# Patient Record
Sex: FEMALE | Race: WHITE | NOT HISPANIC OR LATINO | Employment: FULL TIME | URBAN - NONMETROPOLITAN AREA
[De-identification: names, ages, dates, MRNs, and addresses within clinical notes are randomized per-mention and may not be internally consistent; named-entity substitution may affect disease eponyms.]

---

## 2024-08-22 ENCOUNTER — APPOINTMENT (OUTPATIENT)
Age: 29
End: 2024-08-22
Payer: COMMERCIAL

## 2024-08-22 VITALS
OXYGEN SATURATION: 99 % | HEART RATE: 102 BPM | DIASTOLIC BLOOD PRESSURE: 90 MMHG | BODY MASS INDEX: 33.9 KG/M2 | HEIGHT: 64 IN | WEIGHT: 198.6 LBS | SYSTOLIC BLOOD PRESSURE: 130 MMHG

## 2024-08-22 DIAGNOSIS — Z11.4 SCREENING FOR HIV (HUMAN IMMUNODEFICIENCY VIRUS): ICD-10-CM

## 2024-08-22 DIAGNOSIS — H61.22 IMPACTED CERUMEN OF LEFT EAR: ICD-10-CM

## 2024-08-22 DIAGNOSIS — Z23 NEED FOR DIPHTHERIA-TETANUS-PERTUSSIS (TDAP) VACCINE: ICD-10-CM

## 2024-08-22 DIAGNOSIS — Z11.59 NEED FOR HEPATITIS C SCREENING TEST: ICD-10-CM

## 2024-08-22 DIAGNOSIS — Z00.00 ANNUAL PHYSICAL EXAM: Primary | ICD-10-CM

## 2024-08-22 DIAGNOSIS — Z13.220 SCREENING, LIPID: ICD-10-CM

## 2024-08-22 DIAGNOSIS — Z13.1 SCREENING FOR DIABETES MELLITUS: ICD-10-CM

## 2024-08-22 PROCEDURE — 99385 PREV VISIT NEW AGE 18-39: CPT | Performed by: STUDENT IN AN ORGANIZED HEALTH CARE EDUCATION/TRAINING PROGRAM

## 2024-08-22 PROCEDURE — 90471 IMMUNIZATION ADMIN: CPT | Performed by: STUDENT IN AN ORGANIZED HEALTH CARE EDUCATION/TRAINING PROGRAM

## 2024-08-22 PROCEDURE — 90715 TDAP VACCINE 7 YRS/> IM: CPT | Performed by: STUDENT IN AN ORGANIZED HEALTH CARE EDUCATION/TRAINING PROGRAM

## 2024-08-22 PROCEDURE — 1036F TOBACCO NON-USER: CPT | Performed by: STUDENT IN AN ORGANIZED HEALTH CARE EDUCATION/TRAINING PROGRAM

## 2024-08-22 PROCEDURE — 3008F BODY MASS INDEX DOCD: CPT | Performed by: STUDENT IN AN ORGANIZED HEALTH CARE EDUCATION/TRAINING PROGRAM

## 2024-08-22 PROCEDURE — 69209 REMOVE IMPACTED EAR WAX UNI: CPT | Performed by: STUDENT IN AN ORGANIZED HEALTH CARE EDUCATION/TRAINING PROGRAM

## 2024-08-22 ASSESSMENT — PATIENT HEALTH QUESTIONNAIRE - PHQ9
1. LITTLE INTEREST OR PLEASURE IN DOING THINGS: NOT AT ALL
2. FEELING DOWN, DEPRESSED OR HOPELESS: NOT AT ALL
SUM OF ALL RESPONSES TO PHQ9 QUESTIONS 1 AND 2: 0

## 2024-08-22 NOTE — PATIENT INSTRUCTIONS
"Sinuses:    Every day twice daily    3) 2 puffs Fluticasone in each nostril    After two weeks, reduce that Flonase to 2 puffs once daily     Use ear drops to see if you can clear to wax.    Routine adult health maintenance   It sounds as if things are going well. Keep up the good work!     Exercise:   - Try for at least 150 minutes of cardiovascular (strenuous) exercise per week.   Safety:   - Wear your seat belt at all times when in a car and NO TEXTING/CELL PHONES while driving.   - Wear a helmet while biking, using a motorcycle, skiing/snow boarding, skate/long boarding, or any activities faster than running.   - Avoid smoking.   - If you have a gun it needs to be locked up and stored unloaded away from children.   Nutrition:   - Drink plenty of water each day   - No more than 2 alcoholic drinks per day for men. No more than 1 alcoholic drink per day for woman.   - Read labels for calories   - Use website \"My Fitness Pal\" for free calorie counting tool when possible.   Stress:   - Make time for activities that allow you to decrease stress and recognize any red flags of stress for you to know when to activate your stress release mechanisms.   Sleep:   - Try to get 7-9 hours of sleep each night.   Preventative Care:   - Follow-up yearly for your annual exams   - For most people, the following are indicated:  - reproductive age females prenatal vitamin daily  - Colonoscopies for colon cancer screening starting at age 45, then every 3-10 years  - Mammograms for breast cancer screening every 1-2 years starting at age 40  - Annual flu vaccination   - Bone density screening at age 65   - Pneumonia vaccination at age 65 (some people need this before age 65, so ask your doctor!)  - Shingles vaccination (shingrix) at age 50   "

## 2024-08-22 NOTE — PROGRESS NOTES
Subjective:  Rocio Munoz is a 29 y.o. female who presents to clinic today for annual exam    Employment:  - What do you do for work or school? [employer]  - How is school/work going? Going okay    Social History:  - Who lives with you at home? Mom and her boyfriend  - Have you received a COVID vaccine?: yes    Medical History:   (Please complete in history tab)  - Any changes to your personal or family history? Updated today  - Any personal/family history of the following:   Hypertension, heart attack, high cholesterol, stroke  Cancer of the colon, breast, prostate or skin  Addiction, alcoholism, mental health concerns   Diabetes, thyroid or autoimmune disease    PHQ2- 0    (Provider to ask):    Pillars of health:  - Any issues with sleep? no  - How do you feel about your eating habits? Likes to eat sweets  - What do you do to be physically active? nothing And how often? N/a  - Do you have any goals related to exercise or nutrition? Would like to lose weight and increase activity  - How is your stress level? okay Manageable or is it a problem? manageable  - Any guns in the home? no     Gynecologic History:  (Please complete obstetric history in the history tab)  - Do you have any menstrual concerns? No  - Do you have any breast concerns? no    - Date of last pap smear: no  - Results of last pap smear, if known: N/A  - Personal history of prior abnormal pap smear?: no       Sexual history (provider to ask):  - Have you been sexually active within the past year? no    - Do you have a personal history of sexually transmitted infections (STI)?: no  - Have you ever been tested for HIV? no  - Do you want comprehensive STI testing today? no    Domestic Violence Screening (Provider to ask):  Because violence is so common in many people's lives and because there is help available for those being abused, I now ask every patient about domestic violence:  - Within the past year have you been hit, slapped, kicked or  otherwise physically hurt by someone? ]no  - Are you in a relationship with a person who threatens or physically hurts you? no  - Has anyone forced you to have sexual activities that made you feel uncomfortable? no      Last week she had significant sore throat, challenges with swallowing and pain on one side of her throat. She had not had this before. It completely resolved.    She hasn't had bloodwork done in many years.  Review of Systems    Assessment/Plan:  Rocio Munoz is a 29 y.o. female who presents to clinic today to address the following issues:   1. Screening, lipid  Lipid panel      2. Screening for HIV (human immunodeficiency virus)  HIV 1/2 Antigen/Antibody Screen with Reflex to Confirmation      3. Need for hepatitis C screening test  Hepatitis C antibody      4. Screening for diabetes mellitus  Comprehensive metabolic panel      5. Annual physical exam        6. Need for diphtheria-tetanus-pertussis (Tdap) vaccine  Tdap vaccine, age 7 years and older  (BOOSTRIX)        Well woman exam within normal limits.  Did discuss with Alicia the need for increased exercise and improve diet.  We will obtain screening labs as above as well as give Tdap vaccine.    Discussed some signs of eustachian tube dysfunction/chronic nasal congestion and she will trial flonase.  Patient Instructions   Sinuses:    Every day twice daily    3) 2 puffs Fluticasone in each nostril    After two weeks, reduce that Flonase to 2 puffs once daily     Use ear drops to see if you can clear to wax.    Routine adult health maintenance   It sounds as if things are going well. Keep up the good work!     Exercise:   - Try for at least 150 minutes of cardiovascular (strenuous) exercise per week.   Safety:   - Wear your seat belt at all times when in a car and NO TEXTING/CELL PHONES while driving.   - Wear a helmet while biking, using a motorcycle, skiing/snow boarding, skate/long boarding, or any activities faster than running.  "  - Avoid smoking.   - If you have a gun it needs to be locked up and stored unloaded away from children.   Nutrition:   - Drink plenty of water each day   - No more than 2 alcoholic drinks per day for men. No more than 1 alcoholic drink per day for woman.   - Read labels for calories   - Use website \"My Fitness Pal\" for free calorie counting tool when possible.   Stress:   - Make time for activities that allow you to decrease stress and recognize any red flags of stress for you to know when to activate your stress release mechanisms.   Sleep:   - Try to get 7-9 hours of sleep each night.   Preventative Care:   - Follow-up yearly for your annual exams   - For most people, the following are indicated:  - reproductive age females prenatal vitamin daily  - Colonoscopies for colon cancer screening starting at age 45, then every 3-10 years  - Mammograms for breast cancer screening every 1-2 years starting at age 40  - Annual flu vaccination   - Bone density screening at age 65   - Pneumonia vaccination at age 65 (some people need this before age 65, so ask your doctor!)  - Shingles vaccination (shingrix) at age 50     Follow up: 1 year or sooner as needed    Return precautions discussed.  An After Visit Summary was given to the patient.  All questions were answered and patient in agreement with plan.    Objective:  /90   Pulse 102   Ht 1.626 m (5' 4\")   Wt 90.1 kg (198 lb 9.6 oz)   SpO2 99%   BMI 34.09 kg/m²     Physical Exam  Vitals and nursing note reviewed.   Constitutional:       General: She is not in acute distress.  HENT:      Head: Normocephalic and atraumatic.      Right Ear: Tympanic membrane, ear canal and external ear normal. There is no impacted cerumen.      Left Ear: Tympanic membrane, ear canal and external ear normal. There is impacted cerumen.      Mouth/Throat:      Mouth: Mucous membranes are moist.      Pharynx: No oropharyngeal exudate or posterior oropharyngeal erythema.   Eyes:      " General: No scleral icterus.        Right eye: No discharge.         Left eye: No discharge.      Extraocular Movements: Extraocular movements intact.      Conjunctiva/sclera: Conjunctivae normal.      Pupils: Pupils are equal, round, and reactive to light.   Cardiovascular:      Rate and Rhythm: Normal rate and regular rhythm.   Pulmonary:      Effort: Pulmonary effort is normal. No respiratory distress.      Breath sounds: Normal breath sounds.   Abdominal:      General: Abdomen is flat. There is no distension.   Musculoskeletal:      Cervical back: Normal range of motion. No tenderness.      Right lower leg: No edema.      Left lower leg: No edema.   Lymphadenopathy:      Cervical: No cervical adenopathy.   Skin:     General: Skin is warm and dry.   Neurological:      General: No focal deficit present.      Mental Status: She is alert and oriented to person, place, and time.      Deep Tendon Reflexes: Reflexes normal.   Psychiatric:         Mood and Affect: Mood normal.         Behavior: Behavior normal.         Thought Content: Thought content normal.         Judgment: Judgment normal.       Patient ID: Rocio Louise is a 29 y.o. female.    Ear Cerumen Removal    Date/Time: 8/22/2024 2:06 PM    Performed by: Camelia Byrne MD  Authorized by: Camelia Byrne MD    Consent:     Consent obtained:  Verbal    Consent given by:  Patient  Universal protocol:     Patient identity confirmed:  Verbally with patient  Procedure details:     Location:  L ear    Procedure type: irrigation      Procedure outcomes: unable to remove cerumen    Post-procedure details:     Inspection:  Some cerumen remaining and TM intact    Hearing quality:  Diminished    Procedure completion:  Procedure terminated electively by provider  Comments:      I was unable to remove cerumen directly on top of eardrum.  Did have mildly diminished hearing in that ear although no signs of perforation and no severe pain.    LABS:   No results found for:  "\"CHOL\", \"LDL\", \"HDL\", \"HGBA1C\"    The ASCVD Risk score (Erum DK, et al., 2019) failed to calculate for the following reasons:    The 2019 ASCVD risk score is only valid for ages 40 to 79    IMAGES:   No new images     I spent 40 minutes in total time for this visit including all related clinical activities before, during, and after the visit excluding other billable activities/procedure time.     Camelia Byrne MD    "